# Patient Record
Sex: MALE | Race: WHITE | NOT HISPANIC OR LATINO | Employment: FULL TIME | URBAN - METROPOLITAN AREA
[De-identification: names, ages, dates, MRNs, and addresses within clinical notes are randomized per-mention and may not be internally consistent; named-entity substitution may affect disease eponyms.]

---

## 2023-09-11 ENCOUNTER — HOSPITAL ENCOUNTER (EMERGENCY)
Facility: HOSPITAL | Age: 68
Discharge: HOME | End: 2023-09-11
Attending: EMERGENCY MEDICINE | Admitting: EMERGENCY MEDICINE
Payer: MEDICAID

## 2023-09-11 VITALS
TEMPERATURE: 98.8 F | SYSTOLIC BLOOD PRESSURE: 134 MMHG | WEIGHT: 315 LBS | OXYGEN SATURATION: 97 % | RESPIRATION RATE: 18 BRPM | HEIGHT: 74 IN | BODY MASS INDEX: 40.43 KG/M2 | HEART RATE: 64 BPM | DIASTOLIC BLOOD PRESSURE: 66 MMHG

## 2023-09-11 DIAGNOSIS — N28.9 RENAL INSUFFICIENCY: Primary | ICD-10-CM

## 2023-09-11 DIAGNOSIS — T67.5XXA HEAT EXHAUSTION, INITIAL ENCOUNTER: ICD-10-CM

## 2023-09-11 DIAGNOSIS — E86.0 DEHYDRATION: ICD-10-CM

## 2023-09-11 LAB
ALBUMIN SERPL-MCNC: 4.3 G/DL (ref 3.5–5.2)
ALBUMIN/GLOB SERPL: 1.3 G/DL
ALP SERPL-CCNC: 48 U/L (ref 39–117)
ALT SERPL W P-5'-P-CCNC: 26 U/L (ref 1–41)
ANION GAP SERPL CALCULATED.3IONS-SCNC: 17 MMOL/L (ref 5–15)
AST SERPL-CCNC: 33 U/L (ref 1–40)
BASOPHILS # BLD AUTO: 0.03 10*3/MM3 (ref 0–0.2)
BASOPHILS NFR BLD AUTO: 0.3 % (ref 0–1.5)
BILIRUB SERPL-MCNC: 1 MG/DL (ref 0–1.2)
BUN SERPL-MCNC: 22 MG/DL (ref 8–23)
BUN/CREAT SERPL: 11.3 (ref 7–25)
CALCIUM SPEC-SCNC: 9.6 MG/DL (ref 8.6–10.5)
CHLORIDE SERPL-SCNC: 108 MMOL/L (ref 98–107)
CO2 SERPL-SCNC: 18 MMOL/L (ref 22–29)
CREAT SERPL-MCNC: 1.94 MG/DL (ref 0.76–1.27)
DEPRECATED RDW RBC AUTO: 42.2 FL (ref 37–54)
EGFRCR SERPLBLD CKD-EPI 2021: 37.5 ML/MIN/1.73
EOSINOPHIL # BLD AUTO: 0.08 10*3/MM3 (ref 0–0.4)
EOSINOPHIL NFR BLD AUTO: 0.7 % (ref 0.3–6.2)
ERYTHROCYTE [DISTWIDTH] IN BLOOD BY AUTOMATED COUNT: 12.6 % (ref 12.3–15.4)
GLOBULIN UR ELPH-MCNC: 3.2 GM/DL
GLUCOSE SERPL-MCNC: 132 MG/DL (ref 65–99)
HCT VFR BLD AUTO: 46.5 % (ref 37.5–51)
HGB BLD-MCNC: 16.2 G/DL (ref 13–17.7)
IMM GRANULOCYTES # BLD AUTO: 0.05 10*3/MM3 (ref 0–0.05)
IMM GRANULOCYTES NFR BLD AUTO: 0.4 % (ref 0–0.5)
LYMPHOCYTES # BLD AUTO: 1.02 10*3/MM3 (ref 0.7–3.1)
LYMPHOCYTES NFR BLD AUTO: 8.6 % (ref 19.6–45.3)
MCH RBC QN AUTO: 32 PG (ref 26.6–33)
MCHC RBC AUTO-ENTMCNC: 34.8 G/DL (ref 31.5–35.7)
MCV RBC AUTO: 91.9 FL (ref 79–97)
MONOCYTES # BLD AUTO: 0.66 10*3/MM3 (ref 0.1–0.9)
MONOCYTES NFR BLD AUTO: 5.6 % (ref 5–12)
NEUTROPHILS NFR BLD AUTO: 84.4 % (ref 42.7–76)
NEUTROPHILS NFR BLD AUTO: 9.96 10*3/MM3 (ref 1.7–7)
NRBC BLD AUTO-RTO: 0 /100 WBC (ref 0–0.2)
PLATELET # BLD AUTO: 165 10*3/MM3 (ref 140–450)
PMV BLD AUTO: 11 FL (ref 6–12)
POTASSIUM SERPL-SCNC: 4.4 MMOL/L (ref 3.5–5.2)
PROT SERPL-MCNC: 7.5 G/DL (ref 6–8.5)
RBC # BLD AUTO: 5.06 10*6/MM3 (ref 4.14–5.8)
SODIUM SERPL-SCNC: 143 MMOL/L (ref 136–145)
WBC NRBC COR # BLD: 11.8 10*3/MM3 (ref 3.4–10.8)

## 2023-09-11 PROCEDURE — 85025 COMPLETE CBC W/AUTO DIFF WBC: CPT | Performed by: EMERGENCY MEDICINE

## 2023-09-11 PROCEDURE — 93005 ELECTROCARDIOGRAM TRACING: CPT | Performed by: EMERGENCY MEDICINE

## 2023-09-11 PROCEDURE — 93010 ELECTROCARDIOGRAM REPORT: CPT | Performed by: INTERNAL MEDICINE

## 2023-09-11 PROCEDURE — 80053 COMPREHEN METABOLIC PANEL: CPT | Performed by: EMERGENCY MEDICINE

## 2023-09-11 PROCEDURE — 99283 EMERGENCY DEPT VISIT LOW MDM: CPT

## 2023-09-11 PROCEDURE — 36415 COLL VENOUS BLD VENIPUNCTURE: CPT

## 2023-09-11 NOTE — DISCHARGE INSTRUCTIONS
Your Creatinine today is 1.9, please call your doctor tomorrow to set up close lab recheck upon return home. Increase water intake, would recommend holding your diuretic until cleared to return use by your primary care team.  ED return for worsening symptoms as needed.

## 2023-09-11 NOTE — ED PROVIDER NOTES
EMERGENCY DEPARTMENT ENCOUNTER    Room Number:  37/37  PCP: Provider, No Known  Historian: Patient      HPI:  Chief Complaint: Overheated  A complete HPI/ROS/PMH/PSH/SH/FH are unobtainable due to: None    Context: Mookie Bruno is a 68 y.o. male who presents to the ED via [facility] [place] patient felt like he got overheated and dehydrated.  Had not had anything to drink today other than part of a beer.  Has had decreased water intake over the last couple of days.  Had not eaten today either.  Was out playing 18 holes of golf, felt lightheaded and had a near syncopal event.  No associated chest pain or shortness of breath.  No vomiting or diarrhea.  Patient does report being on a diuretic.  He is currently feeling much improved other than feeling slightly weak in general fashion, received 700 cc IV fluids and 4 mg Zofran prior to arrival with EMS.      MEDICAL RECORD REVIEW    External (non-ED) record review: No prior charts for review in [facility]    PAST MEDICAL HISTORY  Active Ambulatory Problems     Diagnosis Date Noted    No Active Ambulatory Problems     Resolved Ambulatory Problems     Diagnosis Date Noted    No Resolved Ambulatory Problems     No Additional Past Medical History         PAST SURGICAL HISTORY  No past surgical history on file.      FAMILY HISTORY  No family history on file.      SOCIAL HISTORY  Social History     Socioeconomic History    Marital status:          ALLERGIES  Patient has no known allergies.        REVIEW OF SYSTEMS  Review of Systems     All systems reviewed and negative except for those discussed in HPI.       PHYSICAL EXAM    I have reviewed the triage vital signs and nursing notes.    ED Triage Vitals [09/11/23 1452]   Temp Heart Rate Resp BP SpO2   98.8 °F (37.1 °C) 64 18 90/59 97 %      Temp src Heart Rate Source Patient Position BP Location FiO2 (%)   -- -- -- -- --       Physical Exam  General: No acute distress, nontoxic  HEENT: Mucous  membranes tacky, atraumatic, EOMI  Neck: Full ROM  Pulm: Symmetric chest rise, nonlabored, lungs CTAB  Cardiovascular: Regular rate and rhythm, intact distal pulses  GI: Soft, nontender, nondistended, no rebound, no guarding, bowel sounds present  MSK: Full ROM, no deformity  Skin: Warm, dry  Neuro: Awake, alert, oriented x 4, GCS 15, moving all extremities, no focal deficits  Psych: Calm, cooperative      LAB RESULTS  Recent Results (from the past 24 hour(s))   Comprehensive Metabolic Panel    Collection Time: 09/11/23  3:53 PM    Specimen: Blood   Result Value Ref Range    Glucose 132 (H) 65 - 99 mg/dL    BUN 22 8 - 23 mg/dL    Creatinine 1.94 (H) 0.76 - 1.27 mg/dL    Sodium 143 136 - 145 mmol/L    Potassium 4.4 3.5 - 5.2 mmol/L    Chloride 108 (H) 98 - 107 mmol/L    CO2 18.0 (L) 22.0 - 29.0 mmol/L    Calcium 9.6 8.6 - 10.5 mg/dL    Total Protein 7.5 6.0 - 8.5 g/dL    Albumin 4.3 3.5 - 5.2 g/dL    ALT (SGPT) 26 1 - 41 U/L    AST (SGOT) 33 1 - 40 U/L    Alkaline Phosphatase 48 39 - 117 U/L    Total Bilirubin 1.0 0.0 - 1.2 mg/dL    Globulin 3.2 gm/dL    A/G Ratio 1.3 g/dL    BUN/Creatinine Ratio 11.3 7.0 - 25.0    Anion Gap 17.0 (H) 5.0 - 15.0 mmol/L    eGFR 37.5 (L) >60.0 mL/min/1.73   CBC Auto Differential    Collection Time: 09/11/23  3:53 PM    Specimen: Blood   Result Value Ref Range    WBC 11.80 (H) 3.40 - 10.80 10*3/mm3    RBC 5.06 4.14 - 5.80 10*6/mm3    Hemoglobin 16.2 13.0 - 17.7 g/dL    Hematocrit 46.5 37.5 - 51.0 %    MCV 91.9 79.0 - 97.0 fL    MCH 32.0 26.6 - 33.0 pg    MCHC 34.8 31.5 - 35.7 g/dL    RDW 12.6 12.3 - 15.4 %    RDW-SD 42.2 37.0 - 54.0 fl    MPV 11.0 6.0 - 12.0 fL    Platelets 165 140 - 450 10*3/mm3    Neutrophil % 84.4 (H) 42.7 - 76.0 %    Lymphocyte % 8.6 (L) 19.6 - 45.3 %    Monocyte % 5.6 5.0 - 12.0 %    Eosinophil % 0.7 0.3 - 6.2 %    Basophil % 0.3 0.0 - 1.5 %    Immature Grans % 0.4 0.0 - 0.5 %    Neutrophils, Absolute 9.96 (H) 1.70 - 7.00 10*3/mm3    Lymphocytes, Absolute 1.02 0.70  - 3.10 10*3/mm3    Monocytes, Absolute 0.66 0.10 - 0.90 10*3/mm3    Eosinophils, Absolute 0.08 0.00 - 0.40 10*3/mm3    Basophils, Absolute 0.03 0.00 - 0.20 10*3/mm3    Immature Grans, Absolute 0.05 0.00 - 0.05 10*3/mm3    nRBC 0.0 0.0 - 0.2 /100 WBC   ECG 12 Lead Other; near syncope    Collection Time: 09/11/23  5:36 PM   Result Value Ref Range    QT Interval 405 ms    QTC Interval 447 ms       Ordered the above labs and independently interpreted results. My findings will be discussed in the medical decision making section below        RADIOLOGY  No Radiology Exams Resulted Within Past 24 Hours    Ordered the above noted radiological studies.  Independently interpreted by me and my independent review of findings can be found in the ED Course.  See dictation for official radiology interpretation.      PROCEDURES    Procedures  EKG - Per my independent interpretation:    EKG Time: 1736  Rhythm/Rate: Sinus rhythm with rate of 73  Normal axis  Normal intervals  No acute ischemic changes  No STEMI       No prior for comparison      MEDICATIONS GIVEN IN ER    Medications - No data to display      PROGRESS, DATA ANALYSIS, CONSULTS, AND MEDICAL DECISION MAKING    Please note that this section constitutes my independent interpretation of clinical data including lab results, radiology, EKG's.  This constitutes my independent professional opinion regarding differential diagnosis and management of this patient.  It may include any factors such as history from outside sources, review of external records, social determinants of health, management of medications, response to those treatments, and discussions with other providers.    Differential Diagnosis and Plan: Initial concern for orthostatic hypotension, dehydration, renal failure, electrolyte abnormalities, anemia, arrhythmia, among others.  Plan for labs, EKG, and reevaluation with results    Additional sources:  - Discussed/ obtained information from independent  historians:       - Chronic or social conditions impacting care:      - Shared decision making:  Patient and friend at bedside fully updated on and in agreement with the course and plan moving forward    ED Course as of 09/11/23 1755   Mon Sep 11, 2023   1713 WBC(!): 11.80 [DC]   1713 Hemoglobin: 16.2 [DC]   1713 Platelets: 165 [DC]   1713 Glucose(!): 132 [DC]   1713 BUN: 22 [DC]   1713 Creatinine(!): 1.94  No prior [DC]   1713 Sodium: 143 [DC]   1713 Potassium: 4.4 [DC]   1713 ALT (SGPT): 26 [DC]   1713 AST (SGOT): 33 [DC]   1713 Alkaline Phosphatase: 48 [DC]   1713 Total Bilirubin: 1.0 [DC]   1736 I discussed with the patient that I would prefer to give him more IV fluids, however, at this time he is declining and wants to go home with his friend.  Patient is currently from out of town and is returning home on Wednesday.  He states that he will go home and drink more water.  At this time he is well-appearing with a blood pressure 120/80 nondiaphoretic in no acute distress.  I discussed with him that his kidney function is abnormal and does need close recheck within 1 to 2 weeks advised him to call his primary care team tomorrow to try to get that set up.  I advised him to hold his diuretic until he speaks with his primary care team [DC]      ED Course User Index  [DC] Brian Perez MD       Hospitalization Considered?:     Orders Placed During This Visit:  Orders Placed This Encounter   Procedures    Comprehensive Metabolic Panel    Urinalysis With Microscopic If Indicated (No Culture) - Urine, Clean Catch    CBC Auto Differential    ECG 12 Lead Other; near syncope    CBC & Differential       Additional orders considered but not placed:      Independent interpretation of labs, radiology studies, and discussions with consultants: See ED Course        DIAGNOSIS  Final diagnoses:   Renal insufficiency   Dehydration   Heat exhaustion, initial encounter         DISPOSITION  DISCHARGE    Patient discharged in stable  condition.    Reviewed implications of results, diagnosis, meds, responsibility to follow up, warning signs and symptoms of possible worsening, potential complications and reasons to return to ER. If your blood pressure > 120/80 please follow up with your primary care provider for further management.    Patient/Family voiced understanding of above instructions.    Discussed plan for discharge, as there is no emergent indication for admission. Pt/family is agreeable and understands need for follow up and repeat testing.  Pt is aware that discharge does not mean that nothing is wrong but it indicates no emergency is present that requires admission and they must continue care with follow-up as given below or physician of their choice.     FOLLOW-UP  [facility] EMERGENCY DEPARTMENT  [address] [address] [phone]    As needed, If symptoms worsen    Your Primary Care Physician    Call on 9/12/2023           Medication List      No changes were made to your prescriptions during this visit.                       --    Please note that portions of this were completed with a voice recognition program.       Note Disclaimer: At [facility], we believe that sharing information builds trust and better relationships. You are receiving this note because you are receiving care at [facility] or recently visited. It is possible you will see health information before a provider has talked with you about it. This kind of information can be easy to misunderstand. To help you fully understand what it means for your health, we urge you to discuss this note with your provider.           Brian Perez MD  09/11/23 9257

## 2023-09-11 NOTE — ED NOTES
Pt called EMS while playing golf and becoming overheated. EMS found pt resting in the shade with a BP of 90/50. EMS treated pt with 700cc of IV fluid & 4mg zofran.

## 2023-09-12 LAB
QT INTERVAL: 405 MS
QTC INTERVAL: 447 MS